# Patient Record
Sex: MALE | Race: WHITE | NOT HISPANIC OR LATINO | Employment: FULL TIME | URBAN - METROPOLITAN AREA
[De-identification: names, ages, dates, MRNs, and addresses within clinical notes are randomized per-mention and may not be internally consistent; named-entity substitution may affect disease eponyms.]

---

## 2023-04-24 ENCOUNTER — OFFICE VISIT (OUTPATIENT)
Dept: INTERPRETER SERVICES | Facility: CLINIC | Age: 58
End: 2023-04-24
Payer: COMMERCIAL

## 2023-04-24 ENCOUNTER — HOSPITAL ENCOUNTER (EMERGENCY)
Facility: CLINIC | Age: 58
Discharge: HOME | End: 2023-04-24
Attending: EMERGENCY MEDICINE | Admitting: EMERGENCY MEDICINE
Payer: COMMERCIAL

## 2023-04-24 VITALS
DIASTOLIC BLOOD PRESSURE: 59 MMHG | TEMPERATURE: 98 F | RESPIRATION RATE: 14 BRPM | OXYGEN SATURATION: 95 % | SYSTOLIC BLOOD PRESSURE: 187 MMHG | HEART RATE: 89 BPM

## 2023-04-24 DIAGNOSIS — R42 DIZZINESS: ICD-10-CM

## 2023-04-24 LAB
ANION GAP SERPL CALCULATED.3IONS-SCNC: 9 MMOL/L (ref 7–15)
ATRIAL RATE - MUSE: 76 BPM
BASOPHILS # BLD AUTO: 0.1 10E3/UL (ref 0–0.2)
BASOPHILS NFR BLD AUTO: 1 %
BUN SERPL-MCNC: 40.8 MG/DL (ref 6–20)
CALCIUM SERPL-MCNC: 9.4 MG/DL (ref 8.6–10)
CHLORIDE SERPL-SCNC: 103 MMOL/L (ref 98–107)
CREAT SERPL-MCNC: 1.87 MG/DL (ref 0.67–1.17)
DEPRECATED HCO3 PLAS-SCNC: 26 MMOL/L (ref 22–29)
DIASTOLIC BLOOD PRESSURE - MUSE: NORMAL MMHG
EOSINOPHIL # BLD AUTO: 0.3 10E3/UL (ref 0–0.7)
EOSINOPHIL NFR BLD AUTO: 4 %
ERYTHROCYTE [DISTWIDTH] IN BLOOD BY AUTOMATED COUNT: 15.1 % (ref 10–15)
GFR SERPL CREATININE-BSD FRML MDRD: 41 ML/MIN/1.73M2
GLUCOSE BLDC GLUCOMTR-MCNC: 119 MG/DL (ref 70–99)
GLUCOSE SERPL-MCNC: 121 MG/DL (ref 70–99)
HCT VFR BLD AUTO: 36 % (ref 40–53)
HGB BLD-MCNC: 11.5 G/DL (ref 13.3–17.7)
IMM GRANULOCYTES # BLD: 0 10E3/UL
IMM GRANULOCYTES NFR BLD: 1 %
INTERPRETATION ECG - MUSE: NORMAL
LYMPHOCYTES # BLD AUTO: 0.8 10E3/UL (ref 0.8–5.3)
LYMPHOCYTES NFR BLD AUTO: 11 %
MCH RBC QN AUTO: 27.4 PG (ref 26.5–33)
MCHC RBC AUTO-ENTMCNC: 31.9 G/DL (ref 31.5–36.5)
MCV RBC AUTO: 86 FL (ref 78–100)
MONOCYTES # BLD AUTO: 0.9 10E3/UL (ref 0–1.3)
MONOCYTES NFR BLD AUTO: 12 %
NEUTROPHILS # BLD AUTO: 5.5 10E3/UL (ref 1.6–8.3)
NEUTROPHILS NFR BLD AUTO: 71 %
NRBC # BLD AUTO: 0 10E3/UL
NRBC BLD AUTO-RTO: 0 /100
P AXIS - MUSE: 45 DEGREES
PLATELET # BLD AUTO: 332 10E3/UL (ref 150–450)
POTASSIUM SERPL-SCNC: 5.2 MMOL/L (ref 3.4–5.3)
PR INTERVAL - MUSE: 182 MS
QRS DURATION - MUSE: 90 MS
QT - MUSE: 352 MS
QTC - MUSE: 396 MS
R AXIS - MUSE: 48 DEGREES
RBC # BLD AUTO: 4.19 10E6/UL (ref 4.4–5.9)
SODIUM SERPL-SCNC: 138 MMOL/L (ref 136–145)
SYSTOLIC BLOOD PRESSURE - MUSE: NORMAL MMHG
T AXIS - MUSE: 81 DEGREES
TROPONIN T SERPL HS-MCNC: 57 NG/L
TROPONIN T SERPL HS-MCNC: 67 NG/L
VENTRICULAR RATE- MUSE: 76 BPM
WBC # BLD AUTO: 7.6 10E3/UL (ref 4–11)

## 2023-04-24 PROCEDURE — 84484 ASSAY OF TROPONIN QUANT: CPT | Performed by: EMERGENCY MEDICINE

## 2023-04-24 PROCEDURE — 36415 COLL VENOUS BLD VENIPUNCTURE: CPT | Performed by: EMERGENCY MEDICINE

## 2023-04-24 PROCEDURE — 80048 BASIC METABOLIC PNL TOTAL CA: CPT | Performed by: EMERGENCY MEDICINE

## 2023-04-24 PROCEDURE — 93005 ELECTROCARDIOGRAM TRACING: CPT

## 2023-04-24 PROCEDURE — 82962 GLUCOSE BLOOD TEST: CPT

## 2023-04-24 PROCEDURE — 85014 HEMATOCRIT: CPT | Performed by: EMERGENCY MEDICINE

## 2023-04-24 PROCEDURE — 99284 EMERGENCY DEPT VISIT MOD MDM: CPT

## 2023-04-24 PROCEDURE — 250N000013 HC RX MED GY IP 250 OP 250 PS 637: Performed by: EMERGENCY MEDICINE

## 2023-04-24 PROCEDURE — T1013 SIGN LANG/ORAL INTERPRETER: HCPCS | Mod: U3

## 2023-04-24 RX ORDER — MECLIZINE HYDROCHLORIDE 25 MG/1
25 TABLET ORAL EVERY 6 HOURS PRN
Qty: 20 TABLET | Refills: 0 | Status: SHIPPED | OUTPATIENT
Start: 2023-04-24

## 2023-04-24 RX ORDER — MECLIZINE HYDROCHLORIDE 25 MG/1
25 TABLET ORAL ONCE
Status: COMPLETED | OUTPATIENT
Start: 2023-04-24 | End: 2023-04-24

## 2023-04-24 RX ADMIN — MECLIZINE HYDROCHLORIDE 25 MG: 25 TABLET ORAL at 09:07

## 2023-04-24 ASSESSMENT — ENCOUNTER SYMPTOMS
ABDOMINAL PAIN: 0
VOMITING: 0
NAUSEA: 0
DIARRHEA: 0
DIZZINESS: 1
HEADACHES: 1

## 2023-04-24 ASSESSMENT — ACTIVITIES OF DAILY LIVING (ADL)
ADLS_ACUITY_SCORE: 35
ADLS_ACUITY_SCORE: 35

## 2023-04-24 NOTE — ED TRIAGE NOTES
Patient came inwith dizziness assumed to be blood sugar issue, has checked own BG this AM multiple times not low. Patient did take 4 glucose tablet 15G carbs each prior to arrival to ED.     Triage Assessment     Row Name 04/24/23 0813       Triage Assessment (Adult)    Airway WDL WDL       Respiratory WDL    Respiratory WDL WDL;rhythm/pattern    Rhythm/Pattern, Respiratory pattern regular;depth regular;no shortness of breath reported       Skin Circulation/Temperature WDL    Skin Circulation/Temperature WDL WDL       Cardiac WDL    Cardiac WDL WDL  no chest pain, perfusing, regular rate       Peripheral/Neurovascular WDL    Peripheral Neurovascular WDL WDL       Cognitive/Neuro/Behavioral WDL    Cognitive/Neuro/Behavioral WDL X  dizziness    Level of Consciousness alert    Arousal Level opens eyes spontaneously    Orientation oriented x 4    Speech other (see comments)  ASL    Mood/Behavior behavior appropriate to situation       Syd Coma Scale    Best Eye Response 4-->(E4) spontaneous    Best Motor Response 6-->(M6) obeys commands    Best Verbal Response 5-->(V5) oriented    Syd Coma Scale Score 15

## 2023-04-24 NOTE — LETTER
April 24, 2023      To Whom It May Concern:      Robel Hernandez was seen in our Emergency Department today, 04/24/23.  I expect his condition to improve over the next 2 days.  He may return to work/school when improved.    Sincerely,        Garfield Kaye, DO

## 2023-04-24 NOTE — ED PROVIDER NOTES
History   Chief Complaint:  Dizziness    The history is provided by the patient and medical records. A  was used ().      Robel Hernandez is a 57 year old male who communicates through ASL with a history of hearing loss, type 1 diabetes mellitus with insulin pump in place, hypertension, stage 3 chronic kidney disease,, and obstructive sleep apnea who presents with dizziness. The patient states he had onset of intermittent dizziness today while at work. This has been accompanied by intermittent headaches, however he attributes the headaches to seasonal allergies. He initially believed dizziness was due to low blood sugar, however he has checked it multiple times today and has noted it to be normal. He denies any alleviating or exacerbating factors. He denies any pain such as chest pain or abdominal pain. He denies shortness of breath, nausea, vomting, or diarrhea. He has history of type 1 diabetes mellitus and has fluctuating blood sugars a few months ago, so he had an insulin pump put in place. He notes he started taking labetalol in February, 2 months ago. He has been maintaining good food and fluid intake.     The patient states he has been having intermittent dizziness issues for years. He has had similar episodes in the past. His symptoms normally come and go.     Independent Historian:   None - Patient Only    Review of External Notes:   None     ROS:  Review of Systems   Cardiovascular: Negative for chest pain.   Gastrointestinal: Negative for abdominal pain, diarrhea, nausea and vomiting.   Neurological: Positive for dizziness and headaches.   All other systems reviewed and are negative.    Allergies:  Ampicillin  Codeine  Erythromycin   Lisinopril  Sulfa antibiotics  Tramadol      Medications:    Albuterol inhaler  Aspirin (81 mg)  Norvasc   Lipitor   Catapres   Trelegy inhaler   Glucagon   Oretic   Novolog pen  Avapro  Labetalol   Metformin   Metoprolol  Singulair    Prilosec     Past Medical History:    Chronic kidney disease, stage 3  Diabetes mellitus, type 1   Insulin pump status   Hypertension  Hearing loss   Selective immunoglobulin deficiency   Intracranial injury   Moderate persistent asthma   Venous insufficiency   Obstructive sleep apnea with BiPAP  DVT  Pulmonary embolism   GERD    Past Surgical History:    Cataract removal, bilateral  Joint replacement, right   Humerus surgery, left    Cholecystectomy   Shoulder arthroscopy     Family History:    Mother: Hypertension  Father: Hypertension, Diabetes Mellitus     Social History:  The patient was unaccompanied to the emergency department.   reports that he has never smoked. He does not have any smokeless tobacco history on file.  PCP: No primary care provider on file.     Physical Exam     Patient Vitals for the past 24 hrs:   BP Temp Temp src Pulse Resp SpO2   04/24/23 0812 (!) 165/63 98  F (36.7  C) Oral 79 18 98 %      Physical Exam  General: Patient communicates through ASL. Alert and cooperative with exam. Patient in mild distress. Normal mentation.  Head:  Scalp is NC/AT  Eyes:  No scleral icterus, PERRL, EOMI   ENT:  The external nose and ears are normal. The oropharynx is normal and without erythema; mucus membranes are moist.   Neck:  Normal range of motion without rigidity.  CV:  Regular rate and rhythm    No pathologic murmur   Resp:  Breath sounds are clear bilaterally    Non-labored, no retractions or accessory muscle use  GI:  Abdomen is soft, no distension, no tenderness. No peritoneal signs  MS:  No lower extremity edema   Skin:  Warm and dry, No rash or lesions noted.  Neuro: Oriented x 3. No gross motor deficits.    Strength and sensation grossly intact in all 4 extremities.      Cranial nerves 2-12 intact.    GCS: 15.  Ambulates without difficulty.  Normal coordination.    Emergency Department Course   ECG:  ECG results from 04/24/23   EKG 12 lead     Value    Systolic Blood Pressure     Diastolic  Blood Pressure     Ventricular Rate 76    Atrial Rate 76    IN Interval 182    QRS Duration 90        QTc 396    P Axis 45    R AXIS 48    T Axis 81    Interpretation ECG      Sinus rhythm  Normal ECG  No previous ECGs available       Laboratory:  Labs Ordered and Resulted from Time of ED Arrival to Time of ED Departure   GLUCOSE BY METER - Abnormal       Result Value    GLUCOSE BY METER POCT 119 (*)    BASIC METABOLIC PANEL - Abnormal    Sodium 138      Potassium 5.2      Chloride 103      Carbon Dioxide (CO2) 26      Anion Gap 9      Urea Nitrogen 40.8 (*)     Creatinine 1.87 (*)     Calcium 9.4      Glucose 121 (*)     GFR Estimate 41 (*)    TROPONIN T, HIGH SENSITIVITY - Abnormal    Troponin T, High Sensitivity 67 (*)    CBC WITH PLATELETS AND DIFFERENTIAL - Abnormal    WBC Count 7.6      RBC Count 4.19 (*)     Hemoglobin 11.5 (*)     Hematocrit 36.0 (*)     MCV 86      MCH 27.4      MCHC 31.9      RDW 15.1 (*)     Platelet Count 332      % Neutrophils 71      % Lymphocytes 11      % Monocytes 12      % Eosinophils 4      % Basophils 1      % Immature Granulocytes 1      NRBCs per 100 WBC 0      Absolute Neutrophils 5.5      Absolute Lymphocytes 0.8      Absolute Monocytes 0.9      Absolute Eosinophils 0.3      Absolute Basophils 0.1      Absolute Immature Granulocytes 0.0      Absolute NRBCs 0.0     TROPONIN T, HIGH SENSITIVITY - Abnormal    Troponin T, High Sensitivity 57 (*)       Emergency Department Course & Assessments:     Interventions:  Medications   meclizine (ANTIVERT) tablet 25 mg (25 mg Oral $Given 4/24/23 0907)      Assessments:  0821 I obtained history and performed an exam of the patient as documented above.  0932 I rechecked the patient and explained findings.    Independent Interpretation (X-rays, CTs, rhythm strip):  None    Consultations/Discussion of Management or Tests:  None     Social Determinants of Health affecting care:   None    Disposition:  The patient was discharged to home.      Impression & Plan    Medical Decision Making:  Robel Hernandez is a 57 year old male who presents with a feeling of dizziness.  I considered a broad differential including cardiac arrythmia, ACS, aortic stenosis, HOCM, PE, orthostatic hypotension, anemia.  There are no arrhthymias on EKG or during the patient's time on the monitor.  Patient's EKG is without evidence of acute ischemia or infarction.  The patient does not appear clinically dehydrated and I would not expect this based on history and is not significantly anemic.  Electrolytes are otherwise normal.  Labs were notable for chronic renal insufficiency and troponin elevated above gender-specific cut off though below ACS rule in.  Repeat troponin is not significantly changed and patient denies chest pain;; low suspicion for symptoms secondary to ACS.  Patient was noted to be hypertensive though there is no evidence of hypertensive emergency.  He notes improvement with meclizine and neurologic exam is nonfocal.  He has no chest pain concerning for CAD, no seizure activity or post-ictal period, no murmur, and no signs of orthostasis in the ED, and no complaints of concerning severe headache.  The workup in the ED is negative and the physical exam is re-assurring. I will prescribe meclizine to use as needed. Supportive outpatient management is indicated.  Recommended close follow-up with PCP if symptoms persist or blood pressure remains elevated.  Return precautions discussed.  Patient discharged home.    Diagnosis:    ICD-10-CM    1. Dizziness  R42          Discharge Medications:  New Prescriptions    MECLIZINE (ANTIVERT) 25 MG TABLET    Take 1 tablet (25 mg) by mouth every 6 hours as needed for dizziness      Scribe Disclosure:  I, Fang Yang, am serving as a scribe at 8:17 AM on 4/24/2023 to document services personally performed by Garfield Kaye DO based on my observations and the provider's statements to me.      Garfield Kaye,  DO  04/24/23 1614